# Patient Record
Sex: MALE | Race: WHITE | NOT HISPANIC OR LATINO | Employment: FULL TIME | URBAN - METROPOLITAN AREA
[De-identification: names, ages, dates, MRNs, and addresses within clinical notes are randomized per-mention and may not be internally consistent; named-entity substitution may affect disease eponyms.]

---

## 2023-05-24 ENCOUNTER — HOSPITAL ENCOUNTER (EMERGENCY)
Facility: HOSPITAL | Age: 48
Discharge: HOME | End: 2023-05-24
Attending: EMERGENCY MEDICINE
Payer: MEDICAID

## 2023-05-24 VITALS
SYSTOLIC BLOOD PRESSURE: 170 MMHG | TEMPERATURE: 98 F | WEIGHT: 186 LBS | OXYGEN SATURATION: 100 % | DIASTOLIC BLOOD PRESSURE: 85 MMHG | HEIGHT: 74 IN | HEART RATE: 70 BPM | RESPIRATION RATE: 18 BRPM | BODY MASS INDEX: 23.87 KG/M2

## 2023-05-24 DIAGNOSIS — M54.31 SCIATICA OF RIGHT SIDE: Primary | ICD-10-CM

## 2023-05-24 PROCEDURE — 99283 EMERGENCY DEPT VISIT LOW MDM: CPT

## 2023-05-24 RX ORDER — METHOCARBAMOL 500 MG/1
1000 TABLET, FILM COATED ORAL 3 TIMES DAILY
Qty: 30 TABLET | Refills: 0 | Status: SHIPPED | OUTPATIENT
Start: 2023-05-24 | End: 2023-05-29

## 2023-05-24 RX ORDER — DICLOFENAC SODIUM 75 MG/1
75 TABLET, DELAYED RELEASE ORAL 2 TIMES DAILY
Qty: 14 TABLET | Refills: 0 | Status: SHIPPED | OUTPATIENT
Start: 2023-05-24

## 2023-05-24 NOTE — ED PROVIDER NOTES
Encounter Date: 2023       History     Chief Complaint   Patient presents with    Back Pain     Radiating down right leg     Patient presents emergency department with reported chronic low back pain states pain is begun to radiate down his right leg into his calf he states his symptoms are brought on by walking for prolonged periods of time he denies any trauma denies any previous history of sciatica he states that pain is relieved with rest but has been getting progressively worse he denies any bowel or bladder dysfunction he denies any numbness or tingling no weakness      Review of patient's allergies indicates:   Allergen Reactions    Codeine Itching     No past medical history on file.  No past surgical history on file.  Family History   Problem Relation Age of Onset    Cancer Neg Hx      Social History     Tobacco Use    Smoking status: Former     Packs/day: 1.00     Types: Cigarettes     Quit date: 2015     Years since quittin.3    Smokeless tobacco: Never   Substance Use Topics    Alcohol use: No    Drug use: No     Review of Systems   Constitutional:  Negative for chills and fever.   Musculoskeletal:  Positive for back pain and myalgias.   All other systems reviewed and are negative.    Physical Exam     Initial Vitals   BP Pulse Resp Temp SpO2   23 0736 23 0744 23 0736 23 0736 23 0736   (!) 171/95 70 18 97.9 °F (36.6 °C) 100 %      MAP       --                Physical Exam    Constitutional: He appears well-developed and well-nourished. No distress.   HENT:   Head: Normocephalic and atraumatic.   Right Ear: External ear normal.   Left Ear: External ear normal.   Mouth/Throat: Oropharynx is clear and moist.   Eyes: Pupils are equal, round, and reactive to light.   Neck: Neck supple.   Normal range of motion.  Cardiovascular:  Normal rate, regular rhythm, S1 normal, S2 normal, normal heart sounds and intact distal pulses.           Pulmonary/Chest: Breath sounds  normal.   Abdominal: Abdomen is soft. Bowel sounds are normal. There is no abdominal tenderness.   Musculoskeletal:         General: Normal range of motion.      Cervical back: Normal range of motion and neck supple.      Comments: No midline lumbar spine tenderness     Neurological: He is alert and oriented to person, place, and time. He has normal strength and normal reflexes. GCS score is 15. GCS eye subscore is 4. GCS verbal subscore is 5. GCS motor subscore is 6.   Skin: Skin is warm and dry. No rash noted.   Psychiatric: He has a normal mood and affect. His behavior is normal.       ED Course   Procedures  Labs Reviewed - No data to display       Imaging Results              X-Ray Lumbar Spine 5 View (Final result)  Result time 05/24/23 09:38:45      Final result by Alley Myers MD (05/24/23 09:38:45)                   Narrative:    5 views of the lumbar spine    Clinical history is low back pain    The lumbar spine is in satisfactory alignment. The vertebral bodies are of normal height. There is disc space narrowing with degenerative endplate changes at L4-5. There is anterior spurring. There is mild facet hypertrophy at L5-S1. The remainder of the intervertebral disc spaces are maintained. There are no pars defects. The paraspinous soft tissues are normal. The SI joints are symmetrical.    IMPRESSION: Degenerative disc disease at L4-5 and facet hypertrophy at L5-S1    No acute osseous abnormality    Electronically signed by:  Alley Myers MD  5/24/2023 9:38 AM CDT Workstation: [id number]                                     Medications - No data to display  Medical Decision Making:   Clinical Tests:   Radiological Study: Ordered and Reviewed  ED Management:  Radiographs showed degenerative changes but there is no evidence of acute fractures will treat with diclofenac and Robaxin recommend patient follow-up with Dr. Hudson for further evaluation                        Clinical Impression:   Final  diagnoses:  [M54.31] Sciatica of right side (Primary)        ED Disposition Condition    Discharge Stable          ED Prescriptions       Medication Sig Dispense Start Date End Date Auth. Provider    diclofenac (VOLTAREN) 75 MG EC tablet Take 1 tablet (75 mg total) by mouth 2 (two) times daily. 14 tablet 5/24/2023 -- Bryce Goel MD    methocarbamoL (ROBAXIN) 500 MG Tab Take 2 tablets (1,000 mg total) by mouth 3 (three) times daily. for 5 days 30 tablet 5/24/2023 5/29/2023 Bryce Goel MD          Follow-up Information       Follow up With Specialties Details Why Contact Info    Yoel Hudson MD Physical Medicine and Rehabilitation Call in 1 day for further evaluation and treatment [address] [place] [address] [address]  [address]  [facility] [id number] [phone]               Bryce Goel MD  05/24/23 0952       Bryce Goel MD  05/24/23 0952